# Patient Record
Sex: MALE | Race: BLACK OR AFRICAN AMERICAN | Employment: UNEMPLOYED | URBAN - METROPOLITAN AREA
[De-identification: names, ages, dates, MRNs, and addresses within clinical notes are randomized per-mention and may not be internally consistent; named-entity substitution may affect disease eponyms.]

---

## 2022-01-04 ENCOUNTER — HOSPITAL ENCOUNTER (OUTPATIENT)
Dept: ULTRASOUND IMAGING | Age: 37
Discharge: HOME | End: 2022-01-04
Payer: COMMERCIAL

## 2022-01-04 ENCOUNTER — HOSPITAL ENCOUNTER (OUTPATIENT)
Dept: GENERAL RADIOLOGY | Age: 37
Discharge: HOME | End: 2022-01-04
Payer: COMMERCIAL

## 2022-01-04 ENCOUNTER — HOSPITAL ENCOUNTER (OUTPATIENT)
Age: 37
Discharge: HOME | End: 2022-01-04
Payer: COMMERCIAL

## 2022-01-04 DIAGNOSIS — N50.812 TESTICULAR PAIN, LEFT: ICD-10-CM

## 2022-01-04 DIAGNOSIS — M54.50 LOW BACK PAIN, UNSPECIFIED BACK PAIN LATERALITY, UNSPECIFIED CHRONICITY, UNSPECIFIED WHETHER SCIATICA PRESENT: ICD-10-CM

## 2022-01-04 PROCEDURE — 76870 US EXAM SCROTUM: CPT

## 2022-01-04 PROCEDURE — 93975 VASCULAR STUDY: CPT

## 2022-01-04 PROCEDURE — 72100 X-RAY EXAM L-S SPINE 2/3 VWS: CPT

## 2022-05-17 ENCOUNTER — HOSPITAL ENCOUNTER (OUTPATIENT)
Dept: SLEEP CENTER | Age: 37
Discharge: HOME | End: 2022-05-17
Payer: COMMERCIAL

## 2022-05-17 VITALS — WEIGHT: 280 LBS | HEIGHT: 67 IN | BODY MASS INDEX: 43.95 KG/M2

## 2022-05-17 DIAGNOSIS — G47.33 OSA (OBSTRUCTIVE SLEEP APNEA): Primary | ICD-10-CM

## 2022-05-17 PROCEDURE — 99211 OFF/OP EST MAY X REQ PHY/QHP: CPT

## 2022-05-17 ASSESSMENT — SLEEP AND FATIGUE QUESTIONNAIRES
HOW LIKELY ARE YOU TO NOD OFF OR FALL ASLEEP WHILE SITTING QUIETLY AFTER LUNCH WITHOUT ALCOHOL: 1
NECK CIRCUMFERENCE (INCHES): 18.75
HOW LIKELY ARE YOU TO NOD OFF OR FALL ASLEEP WHILE LYING DOWN TO REST IN THE AFTERNOON WHEN CIRCUMSTANCES PERMIT: 3
HOW LIKELY ARE YOU TO NOD OFF OR FALL ASLEEP WHILE SITTING AND READING: 2
ESS TOTAL SCORE: 14
HOW LIKELY ARE YOU TO NOD OFF OR FALL ASLEEP WHILE SITTING AND TALKING TO SOMEONE: 1
HOW LIKELY ARE YOU TO NOD OFF OR FALL ASLEEP WHEN YOU ARE A PASSENGER IN A CAR FOR AN HOUR WITHOUT A BREAK: 1
HOW LIKELY ARE YOU TO NOD OFF OR FALL ASLEEP WHILE WATCHING TV: 3
HOW LIKELY ARE YOU TO NOD OFF OR FALL ASLEEP IN A CAR, WHILE STOPPED FOR A FEW MINUTES IN TRAFFIC: 1
HOW LIKELY ARE YOU TO NOD OFF OR FALL ASLEEP WHILE SITTING INACTIVE IN A PUBLIC PLACE: 2

## 2022-05-17 NOTE — PROGRESS NOTES
Amish Andrade MD, Kamilla Castro MD, Chuy Nunes MD, Maksim Ventura MD, [facility]      [address], [address] [facility]   PH: [phone]  F: [phone]     Subjective:     Patient ID: Damian Perez is a 31 y.o. male, referred to the sleep center for   Chief Complaint   Patient presents with    Sleep Apnea     G47.30   . Referring physician:  andre Bauer     History:33 year old male with loud snoring. He is witnessed to stop breathing. He does not feel fresh when wakes up and feels tired and fatigued all day. He has EDS. he has gained about 30 lbs in past 10 years    Social History     Socioeconomic History    Marital status: Single     Spouse name: Not on file    Number of children: Not on file    Years of education: Not on file    Highest education level: Not on file   Occupational History    Not on file   Tobacco Use    Smoking status: Passive Smoke Exposure - Never Smoker    Smokeless tobacco: Never Used   Substance and Sexual Activity    Alcohol use: Yes     Alcohol/week: 0.0 standard drinks     Comment: Socially    Drug use: No    Sexual activity: Not Currently     Partners: Female   Other Topics Concern    Not on file   Social History Narrative    Not on file     Social Determinants of Health     Financial Resource Strain:     Difficulty of Paying Living Expenses: Not on file   Food Insecurity:     Worried About Running Out of Food in the Last Year: Not on file    Alejandro of Food in the Last Year: Not on file   Transportation Needs:     Lack of Transportation (Medical): Not on file    Lack of Transportation (Non-Medical):  Not on file   Physical Activity:     Days of Exercise per Week: Not on file    Minutes of Exercise per Session: Not on file   Stress:     Feeling of Stress : Not on file   Social Connections:     Frequency of Communication with Friends and Family: Not on file  Frequency of Social Gatherings with Friends and Family: Not on file    Attends Advent Services: Not on file    Active Member of Clubs or Organizations: Not on file    Attends Club or Organization Meetings: Not on file    Marital Status: Not on file   Intimate Partner Violence:     Fear of Current or Ex-Partner: Not on file    Emotionally Abused: Not on file    Physically Abused: Not on file    Sexually Abused: Not on file   Housing Stability:     Unable to Pay for Housing in the Last Year: Not on file    Number of [place] in the Last Year: Not on file    Unstable Housing in the Last Year: Not on file       Prior to Admission medications    Medication Sig Start Date End Date Taking?  Authorizing Provider   ibuprofen (ADVIL;MOTRIN) 600 MG tablet Take 1 tablet by mouth every 6 hours as needed for Pain 7/4/18  Yes NALLELY Sarabia   sertraline (ZOLOFT) 100 MG tablet Take 1 tablet by mouth daily 11/7/16  Yes Brenda Jacobson MD   buPROPion (WELLBUTRIN XL) 150 MG extended release tablet Take 2 tablets by mouth every morning 11/7/16  Yes Brenda Jacobson MD   albuterol sulfate HFA (PROAIR HFA) 108 (90 BASE) MCG/ACT inhaler Inhale 2 puffs into the lungs every 6 hours as needed for Wheezing 6/6/16  Yes Dnaie Jacobson MD   traZODone (DESYREL) 50 MG tablet Take 1 tablet by mouth nightly  Patient not taking: Reported on 5/17/2022 6/6/16   Rito Payne MD       Allergies as of 05/17/2022    (No Known Allergies)       Patient Active Problem List   Diagnosis    Mild intermittent asthma without complication    Benign prostatic hyperplasia with urinary retention    Moderate single current episode of major depressive disorder ([facility] [address]       Past Medical History:   Diagnosis Date    Asthma     Concussion 05/2016       Past Surgical History:   Procedure Laterality Date    TONSILLECTOMY  2006    WISDOM TOOTH EXTRACTION  2006, 2016       Family History   Problem Relation Age of Onset    Diabetes Father     Hypertension Father     Hypertension Mother    [place] Migraines Mother     Asthma Sister     Asthma Brother     Cancer Paternal Grandmother     Cancer Paternal Grandfather     Diabetes Maternal Grandmother     Hypertension Maternal Grandmother          Objective:     Vitals:    05/17/22 1110   Weight: 280 lb (127 kg)   Height: 5' 7\" (1.702 m)     Neck circumference (Inches): 18.75  Inches  [place] - Total score: 14    Gen: No distress. Eyes: PERRL. No sclera icterus. No conjunctival injection. ENT: No discharge. Pharynx clear. External appearance of ears and nose normal.  Neck: Trachea midline. No obvious mass. Resp: No accessory muscle use. No crackles. No wheezes. No rhonchi. No dullness on percussion. CV: Regular rate. Regular rhythm. No murmur or rub. No edema. GI: Non-tender. Non-distended. No hernia. Skin: Warm, dry, normal texture and turgor. No nodule on exposed extremities. Lymph: No cervical LAD. No supraclavicular LAD. M/S: No cyanosis. No clubbing. No joint deformity. Psych: Oriented x 3. No anxiety. Awake. Alert. Intact judgement and insight.     Mallampati Airway Classification:   []1 []2 [x]3 []4        Sleep Complaints/Symptoms:    Normal Bedtime:      Normal Wake Time:   Average Sleep Time:      Number of Awakenings:    Duration of Sleep Complaints:3 years    [x] Snoring     [] Improved [x] Not Improved    [] Choking/Gasping for Breath  [] Improved [] Not Improved       [x] Witnessed Apneas              [] Improved [x] Not Improved  [x] Daytime Sleepiness             [] Improved [x] Not Improved  [] Morning Headaches    [] Improved [] Not Improved  [] Frequent Awakenings       [] Improved [] Not Improved  [] Jerky Movements   [] Improved [] Not Improved   [] Restless Legs   [] Improved [] Not Improved   [] Difficulty Initiating Sleep  [] Improved [] Not Improved   [] Difficulty Maintaining Sleep  [] Improved [] Not Improved   [] Restless Sleep    [] Improved [] Not Improved   [] Sleep Paralysis    [] Improved [] Not Improved   [] Muscle Weakness w/ Emotion  [] Improved [] Not Improved  [] Other :     CPAP Usage:    [x]  Patient has never worn CPAP  []  Patient has worn CPAP previously but discontinued use  []  Current PAP user,  [years]   []  Patient Tolerates Well   []  Patient Does Not Tolerate     []  Patient Uses CPAP      []  More Than 4 Hours      []  Less Than 4 Hours  []  CPAP/BPAP/ASV Pressure Readings   []  CPAP Pressure      cm H20   []  BPAP Pressure       cm H20   []  ASV Pressure         cm H20      Assessment:      Diagnosis:  danielle       Patient Active Problem List    Diagnosis Date Noted    Mild intermittent asthma without complication 66/44/4290    Benign prostatic hyperplasia with urinary retention 06/06/2016     Overview Note:     Updating Deprecated Diagnoses      Moderate single current episode of major depressive disorder ([facility] [address] 06/06/2016     Plan:        Sleep Study:     []  Sleep hygiene/ relaxation methods & CBTi principles review with patient     []  HST - Home Sleep Study   [x]  PSG - Overnight Diagnostic Polysomnogram     []  CPAP Titration    [] Split Night Study    [] BiLevel Titration    [] ASV - Auto-Servo Ventilation Titration       []  MSLT - Multiple Sleep Latency Test   []  MWT - Maintenance of Wakefulness Test    CPAP Therapy:     []  Patient to be seen for new mask fitting/desensitization   []  AutoPAP Titration    []  CPAP supplies and equipment at ________cmH2O    []  Continue same CPAP pressure   []  Change CPAP pressure to _______cm H2O   []  CPAP supplies only, no pressure change   []  Refer for an oral appliance       Medications:       [x]  Continue current medication    []  Add Medication:  ________________    Follow-Up:     []  No follow up required. Patient to return as needed. []  2 weeks   []  4 weeks   []  2 months   []  4 months   []  6 months   []  1 year for CPAP compliance evaluation.  Patient to return sooner, as needed. [x]  Follow up after sleep study   []  Other: ______advised weight loss and sleep hygiene________    No orders of the defined types were placed in this encounter.          Electronically signed by Lissa Guerrero MD on 5/17/2022 at 11:15 AM

## 2022-05-21 ENCOUNTER — HOSPITAL ENCOUNTER (OUTPATIENT)
Dept: SLEEP CENTER | Age: 37
Discharge: HOME | End: 2022-05-21
Payer: COMMERCIAL

## 2022-05-21 DIAGNOSIS — G47.33 OSA (OBSTRUCTIVE SLEEP APNEA): ICD-10-CM

## 2022-05-21 PROCEDURE — 95810 POLYSOM 6/> YRS 4/> PARAM: CPT

## 2022-05-22 NOTE — PROGRESS NOTES
5/22/2022  sleep study  for Farhat Watson  1985 is complete. Results are pending physician review.     Electronically signed by Dena Sesay RCP on 5/22/2022 at 6:40 AM

## 2022-05-25 LAB — STATUS: NORMAL

## 2022-06-14 ENCOUNTER — HOSPITAL ENCOUNTER (OUTPATIENT)
Dept: SLEEP CENTER | Age: 37
Discharge: HOME | End: 2022-06-14

## 2022-06-14 DIAGNOSIS — G47.33 OSA (OBSTRUCTIVE SLEEP APNEA): Primary | ICD-10-CM

## 2022-06-14 PROCEDURE — 9990000010 HC NO CHARGE VISIT

## 2022-06-14 NOTE — PROGRESS NOTES
Thalia Alejo MD, Vielka Vinson MD, Adali Mayo MD, [facility]      [address], [address] [facility]   PH: [phone]  F: [phone]     Subjective:     Patient ID: Irina Barragan is a 31 y.o. male, following up today with the sleep center. Reason for Follow Up/Chief Complaint:   Chief Complaint   Patient presents with    Sleep Apnea     G47.33    1 Month Follow-Up       Results:    History: there is no change    Social History     Socioeconomic History    Marital status: Single     Spouse name: Not on file    Number of children: Not on file    Years of education: Not on file    Highest education level: Not on file   Occupational History    Not on file   Tobacco Use    Smoking status: Passive Smoke Exposure - Never Smoker    Smokeless tobacco: Never Used   Substance and Sexual Activity    Alcohol use: Yes     Alcohol/week: 0.0 standard drinks     Comment: Socially    Drug use: No    Sexual activity: Not Currently     Partners: Female   Other Topics Concern    Not on file   Social History Narrative    Not on file     Social Determinants of Health     Financial Resource Strain:     Difficulty of Paying Living Expenses: Not on file   Food Insecurity:     Worried About Running Out of Food in the Last Year: Not on file    Alejandro of Food in the Last Year: Not on file   Transportation Needs:     Lack of Transportation (Medical): Not on file    Lack of Transportation (Non-Medical):  Not on file   Physical Activity:     Days of Exercise per Week: Not on file    Minutes of Exercise per Session: Not on file   Stress:     Feeling of Stress : Not on file   Social Connections:     Frequency of Communication with Friends and Family: Not on file    Frequency of Social Gatherings with Friends and Family: Not on file    Attends Advent Services: Not on file    Active Member of Clubs or Organizations: Not on file  Attends Club or Organization Meetings: Not on file    Marital Status: Not on file   Intimate Partner Violence:     Fear of Current or Ex-Partner: Not on file    Emotionally Abused: Not on file    Physically Abused: Not on file    Sexually Abused: Not on file   Housing Stability:     Unable to Pay for Housing in the Last Year: Not on file    Number of [place] in the Last Year: Not on file    Unstable Housing in the Last Year: Not on file       Prior to Admission medications    Medication Sig Start Date End Date Taking?  Authorizing Provider   ibuprofen (ADVIL;MOTRIN) 600 MG tablet Take 1 tablet by mouth every 6 hours as needed for Pain 7/4/18  Yes NALLELY Riggins   sertraline (ZOLOFT) 100 MG tablet Take 1 tablet by mouth daily 11/7/16  Yes Brenda Jacobson MD   buPROPion (WELLBUTRIN XL) 150 MG extended release tablet Take 2 tablets by mouth every morning 11/7/16  Yes Brenda Jacobson MD   albuterol sulfate HFA (PROAIR HFA) 108 (90 BASE) MCG/ACT inhaler Inhale 2 puffs into the lungs every 6 hours as needed for Wheezing 6/6/16  Yes Andres Landeros MD   traZODone (DESYREL) 50 MG tablet Take 1 tablet by mouth nightly 6/6/16  Yes Andres Landeros MD       Allergies as of 06/14/2022    (No Known Allergies)       Patient Active Problem List   Diagnosis    Mild intermittent asthma without complication    Benign prostatic hyperplasia with urinary retention    Moderate single current episode of major depressive disorder ([facility] [address]       Past Medical History:   Diagnosis Date    Asthma     Concussion 05/2016       Past Surgical History:   Procedure Laterality Date    TONSILLECTOMY  2006    WISDOM TOOTH EXTRACTION  2006, 2016       Family History   Problem Relation Age of Onset    Diabetes Father     Hypertension Father     Hypertension Mother     Migraines Mother     Asthma Sister     Asthma Brother     Cancer Paternal Grandmother     Cancer Paternal Grandfather     Diabetes Maternal Grandmother     Hypertension Maternal Grandmother          Objective: There were no vitals filed for this visit. Neck -    Inches  [place] -        Assessment:      Diagnosis:  sev danielle with ahi 64/hr and pulse ox drop 73%     Plan:      1.d/w pt  2.repeat sleep study for cpap titration  3.advised weight loss and sleep hygiene  rtc after sleep study    Lianna Rosales is a 31 y.o. male being evaluated by a Virtual Visit (video visit) encounter to address concerns as mentioned above. A caregiver was present when appropriate. Due to this being a TeleHealth encounter (During [facility]-38 public health emergency), evaluation of the following organ systems was limited: Vitals/Constitutional/EENT/Resp/CV/GI//MS/Neuro/Skin/Heme-Lymph-Imm. Pursuant to the emergency declaration under the [address] [place] [address], [address] [place] [address] authority and the [employer] Act, this Virtual Visit was conducted with patient's (and/or legal guardian's) consent, to reduce the patient's risk of exposure to COVID-19 and provide necessary medical care. The patient (and/or legal guardian) has also been advised to contact this office for worsening conditions or problems, and seek emergency medical treatment and/or call 911 if deemed necessary. Patient identification was verified at the start of the visit: Yes    Services were provided through a video synchronous discussion virtually to substitute for in-person clinic visit. Patient and provider were located at their individual homes. --Rolando Mcdonnell MD on 6/14/2022 at 11:35 AM    An electronic signature was used to authenticate this note. No orders of the defined types were placed in this encounter.          Electronically signed by Rolando Mcdonnell MD on 6/14/2022 at 11:35 AM

## 2022-09-09 ENCOUNTER — HOSPITAL ENCOUNTER (OUTPATIENT)
Dept: SLEEP CENTER | Age: 37
Discharge: HOME | End: 2022-09-09
Payer: COMMERCIAL

## 2022-09-09 DIAGNOSIS — G47.33 OSA (OBSTRUCTIVE SLEEP APNEA): ICD-10-CM

## 2022-09-09 PROCEDURE — 95811 POLYSOM 6/>YRS CPAP 4/> PARM: CPT

## 2022-09-10 NOTE — PROGRESS NOTES
9/10/2022  sleep study  for Prema Macedo  1985 is complete. Covid Rapid Test done in Lab - Negative Result  Results are pending physician review.     Electronically signed by Tyra Mcgarry on 9/10/2022 at 4:17 AM

## 2022-09-15 LAB — STATUS: NORMAL
